# Patient Record
Sex: MALE | Race: BLACK OR AFRICAN AMERICAN | NOT HISPANIC OR LATINO | Employment: STUDENT | URBAN - METROPOLITAN AREA
[De-identification: names, ages, dates, MRNs, and addresses within clinical notes are randomized per-mention and may not be internally consistent; named-entity substitution may affect disease eponyms.]

---

## 2018-05-22 ENCOUNTER — OFFICE VISIT (OUTPATIENT)
Dept: PEDIATRICS | Facility: CLINIC | Age: 23
End: 2018-05-22
Payer: COMMERCIAL

## 2018-05-22 VITALS
DIASTOLIC BLOOD PRESSURE: 60 MMHG | HEART RATE: 64 BPM | HEIGHT: 74 IN | SYSTOLIC BLOOD PRESSURE: 132 MMHG | WEIGHT: 215.63 LBS | BODY MASS INDEX: 27.67 KG/M2

## 2018-05-22 DIAGNOSIS — R42 DIZZINESS: ICD-10-CM

## 2018-05-22 DIAGNOSIS — Z00.00 WELL ADULT EXAM: Primary | ICD-10-CM

## 2018-05-22 PROCEDURE — 99395 PREV VISIT EST AGE 18-39: CPT | Mod: S$GLB,,, | Performed by: PEDIATRICS

## 2018-05-22 NOTE — PROGRESS NOTES
Subjective:     William Walter is a 22 y.o. male here with patient. Patient brought in for Well Child (duran and  Aptela      brought in by mom jus )       History was provided by the patient.    William Walter is a 22 y.o. male who is here for this well-child visit.    Current Issues:  Current concerns include none.  Currently menstruating? not applicable  Sexually active? No   Does patient snore? no     Review of Nutrition:  Current diet: family meals  Balanced diet? yes    Social Screening:   Parental relations: good  Sibling relations: brothers: 1  Discipline concerns? no  Concerns regarding behavior with peers? no  School performance: doing well; no concerns  Secondhand smoke exposure? no    Screening Questions:  Risk factors for anemia: no  Risk factors for vision problems: no  Risk factors for hearing problems: no  Risk factors for tuberculosis: no  Risk factors for dyslipidemia: no  Risk factors for sexually-transmitted infections: no  Risk factors for alcohol/drug use:  no    Review of Systems   Constitutional: Negative.    HENT: Negative.    Eyes: Negative.    Respiratory: Negative.    Cardiovascular: Negative.    Gastrointestinal: Negative.    Genitourinary: Negative.    Musculoskeletal: Negative.    Neurological: Negative.    Psychiatric/Behavioral: Negative.          Objective:     Physical Exam   Constitutional: He is oriented to person, place, and time. He appears well-developed and well-nourished. No distress.   HENT:   Head: Normocephalic.   Right Ear: Hearing, tympanic membrane and external ear normal.   Left Ear: Hearing, tympanic membrane and external ear normal.   Mouth/Throat: Mucous membranes are normal.   Eyes: Conjunctivae are normal. Pupils are equal, round, and reactive to light.   Neck: Normal range of motion. Neck supple.   Cardiovascular: Normal rate, regular rhythm and normal heart sounds.    No murmur heard.  Pulmonary/Chest: Effort normal and breath sounds normal. No  respiratory distress.   Abdominal: Soft. Bowel sounds are normal.   Genitourinary:   Genitourinary Comments: Normal Gu for a pubertal male   Musculoskeletal: He exhibits no edema.   Neurological: He is alert and oriented to person, place, and time.   Skin: Skin is warm and dry. No rash noted.       Assessment:      Well adolescent.      Plan:      1. Anticipatory guidance discussed.  Gave handout on well-child issues at this age.    2.  Weight management:  The patient was counseled regarding physical activity  3. Immunizations today: per orders.    ADDITIONAL NOTE:  This is a patient well known to my practice who  has a past medical history of ADHD (attention deficit hyperactivity disorder).. The patient is here for well check presents with dizzy feeling with exercise. It takes a while to recover.     PE:  Per previous physical and additionally  Gen:NAD calm  CV:RRR and no murmur, 2+ pulses  GI: soft abdomen with normal BS, NT/ND  Neuro: good tone and brisk reflexes      Well adult exam    Dizziness  -     CBC auto differential; Future  -     Comprehensive metabolic panel; Future  -     Hemoglobin A1c; Future  -     TSH; Future  -     T4, FREE; Future

## 2018-05-22 NOTE — PATIENT INSTRUCTIONS
Prevention Guidelines, Men Ages 18 to 39  Screening tests and vaccines are an important part of managing your health. Health counseling is essential, too. Below are guidelines for these, for men ages 18 to 39. Talk with your healthcare provider to make sure youre up-to-date on what you need.  Screening Who needs it How often   Alcohol misuse All men in this age group At routine exams   Blood pressure All men in this age group Every 2 years if your blood pressure is less than 120/80 mm Hg; yearly if your systolic blood pressure is 120 to 139 mm Hg, or your diastolic blood pressure reading is 80 to 89 mm Hg   Depression All men in this age group At routine exams   Diabetes mellitus, type 2 Adults who have no symptoms but are overweight or obese and have 1 or more other risk factors for diabetes At least every 3 years (yearly if blood sugar has already started to rise)   Hepatitis C If at increased risk At routine exams   High cholesterol or triglycerides All men ages 35 and older, and younger men at high risk for coronary artery disease At least every 5 years   HIV All men At routine exams   Obesity All men in this age group At routine exams   Syphilis Men at increased risk for infection - talk with your healthcare provider At routine exams   Tuberculosis Men at increased risk for infection - talk with your healthcare provider Check with your healthcare provider   Vision All men in this age group Every 5 to 10 years if no risk factors for eye disease   Vaccines Who needs it How often   Chickenpox (varicella) All men in this age group who have no record of this infection or vaccine 2 doses; the second dose should be given at least 4 weeks after the first dose   Hepatitis A Men at increased risk for infection - talk with your healthcare provider 2 doses given at least 6 months apart   Hepatitis B Men at increased risk for infection - talk with your healthcare provider 3 doses over 6 months; second dose should be  given 1 month after the first dose; the third dose should be given at least 2 months after the second dose and at least 4 months after the first dose   Haemophilus influenzae Type B (HIB) Men at increased risk for infection - talk with your healthcare provider 1 to 3 doses   Human papillomavirus (HPV) All men in this age group up to age 26 3 doses; the second dose should be given 1 to 2 months after the first dose and the third dose given 6 months after the first dose   Influenza (flu) All men in this age group Once a year   Measles, mumps, rubella (MMR) All men in this age group who have no record of these infections or vaccines 1 or 2 doses through age 55   Meningococcal Men at increased risk for infection - talk with your healthcare provider 1 or more doses   Pneumococca (PCV13) and Pneumococcal (PPSV23) Men at increased risk for infection - talk with your healthcare provider PCV13: 1 dose ages 19 to 65 (protects against 13 types of pneumococcal bacteria)  PPSV23: 1 to 2 doses through age 64, or 1 dose at 65 or older (protects against 23 types of pneumococcal bacteria)   Tetanus/diphtheria/pertussis (Td/Tdap) booster All men in this age group A one-time Tdap booster after age 18, then Td every10 years   Counseling Who needs it How often   Diet and exercise Overweight or obese people When diagnosed, and then at routine exams   Use of tobacco and the health effects it can cause All men in this age group Every visit   Sexually transmitted infection prevention Men who are sexually active At routine exams   Skin cancer Prevention of skin cancer in fair-skinned adults through age 24 At routine exams   1Those who are 18 years of age, who are not up-to-date on their childhood immunizations, should receive all appropriate catch-up vaccines recommended by the CDC.  Date Last Reviewed: 2/1/2017  © 8076-4126 [employer]. [address] [place] [address], [place] [address]. All rights reserved. This information is not  intended as a substitute for professional medical care. Always follow your healthcare professional's instructions.

## 2018-05-23 ENCOUNTER — TELEPHONE (OUTPATIENT)
Dept: PEDIATRICS | Facility: CLINIC | Age: 23
End: 2018-05-23

## 2018-05-23 NOTE — TELEPHONE ENCOUNTER
----- Message from Ximena Pavon MD sent at 5/23/2018  4:40 PM CDT -----  Nurse to inform William that all labs are wnl and stable

## 2019-10-16 NOTE — PROGRESS NOTES
This note was created by combination of typed  and M-Modal dictation.  Transcription errors may be present.  If there are any questions, please contact me.    Assessment & Plan:   Chronic pain of right knee  -pain and swelling of the knee area and distal quadriceps.  I think this is more of the surrounding soft tissue and not intrinsic of the knee joint.  I suspect this may be patellofemoral syndrome.  Home physical therapy handout.  Check plain film x-ray.  History of injury to the proximal tibia.  I am going to refer him to Orthopedics for evaluation and recommendations.  Defer to them regarding any intervention such as injection.  He works and is in school, physical therapy may be difficult for him to attend.  rx for naproxen. Home PT handout.  -     Cancel: X-ray Knee Ortho Right; Future; Expected date: 10/17/2019  -     Ambulatory referral/consult to Orthopedics; Future; Expected date: 10/17/2019  -     naproxen (NAPROSYN) 500 MG tablet; Take 1 tablet (500 mg total) by mouth 2 (two) times daily.  Dispense: 30 tablet; Refill: 2        Medications Discontinued During This Encounter   Medication Reason    lisdexamfetamine (VYVANSE) 40 MG Cap Therapy completed       meds sent this encounter:  Medications Ordered This Encounter   Medications    naproxen (NAPROSYN) 500 MG tablet     Sig: Take 1 tablet (500 mg total) by mouth 2 (two) times daily.     Dispense:  30 tablet     Refill:  2       Follow Up: No follow-ups on file.    Subjective:     Chief Complaint   Patient presents with    Leg Swelling     Right leg        HPI  William is a 24 y.o. male, last appointment with this clinic was Visit date not found.    NP  Has been followed by pediatrics  ADHD, hx of stimulant therapy, not currently taking.  History of leg fracture in the 8th grade.  08/2010    Comes in today complaining of swelling of the distal quadriceps and the surrounding knee area on the right.  History of fracture of his leg back in 8th  grade and had surgery to repair, with some residual soft tissue swelling ever since.  He notes that ever since then, he has had stiffness in his knee.  He used to be able to do more physical activity but as the years progressed he has gotten more and more discomfort and feeling of instability and has been more restricted.  He is now having discomfort with full flexion, in the knee on the medial and lateral side as well as sensation of swelling to that knee and when he looks at the other knee, notices swelling. No recent injury.  Pain with going up and down stairs.    08/12/2010 x-ray of the knee showing acute Salter 2 type fracture involving the proximal right tibia without significant degree of displacement or angulation.  On lateral, linear lucency suggesting a recent nondisplaced and nonangulated fracture of the proximal fibular shaft    He has to wear knee brace otherwise it is too uncomfortable.    Patient Care Team:  Ximena Pavon MD as PCP - General (Pediatrics)    Patient Active Problem List    Diagnosis Date Noted    Overweight(278.02) 08/27/2013     Aug 13 1. Increase activity  2. Decrease intake  3. rtc 6 months for weight check    Dx updated per 2019 IMO Load      ADHD (attention deficit hyperactivity disorder) 09/05/2012     Med check due 11/23/16 vyvanse 40mg (FROM concerta 27mg/ 36 MG DUE TO JITTERINESS ON 11/15/140         PAST MEDICAL HISTORY:  Past Medical History:   Diagnosis Date    ADHD (attention deficit hyperactivity disorder)        PAST SURGICAL HISTORY:  Past Surgical History:   Procedure Laterality Date    CIRCUMCISION      LEG SURGERY Right 2010    tibia fracture     Family History   Problem Relation Age of Onset    No Known Problems Mother     No Known Problems Father     No Known Problems Brother        SOCIAL HISTORY:  Social History     Socioeconomic History    Marital status: Single     Spouse name: Not on file    Number of children: Not on file    Years of education: Not  "on file    Highest education level: Not on file   Occupational History    Occupation: [employer]   Social Needs    Financial resource strain: Not on file    Food insecurity:     Worry: Not on file     Inability: Not on file    Transportation needs:     Medical: Not on file     Non-medical: Not on file   Tobacco Use    Smoking status: Never Smoker    Smokeless tobacco: Never Used   Substance and Sexual Activity    Alcohol use: No    Drug use: No    Sexual activity: Never   Lifestyle    Physical activity:     Days per week: Not on file     Minutes per session: Not on file    Stress: Not on file   Relationships    Social connections:     Talks on phone: Not on file     Gets together: Not on file     Attends Worship service: Not on file     Active member of club or organization: Not on file     Attends meetings of clubs or organizations: Not on file     Relationship status: Not on file   Other Topics Concern    Not on file   Social History Narrative    Not on file       ALLERGIES AND MEDICATIONS: updated and reviewed.  Review of patient's allergies indicates:   Allergen Reactions    Fish containing products      Current Outpatient Medications   Medication Sig Dispense Refill    loratadine (CLARITIN) 10 mg tablet Take 1 tablet (10 mg total) by mouth once daily. 30 tablet 2     No current facility-administered medications for this visit.        Review of Systems   Constitutional: Negative for chills and fever.   Respiratory: Negative for shortness of breath.    Cardiovascular: Negative for chest pain.       Objective:   Physical Exam   Vitals:    10/17/19 1454   BP: 134/68   BP Location: Right arm   Patient Position: Sitting   BP Method: Large (Manual)   Pulse: 63   Temp: 97.8 °F (36.6 °C)   TempSrc: Oral   SpO2: 98%   Weight: 106.3 kg (234 lb 5.6 oz)   Height: 6' 1" (1.854 m)    Body mass index is 30.92 kg/m².  Weight: 106.3 kg (234 lb 5.6 oz)   Height: 6' 1" (185.4 cm)     Physical Exam   Constitutional: He " is oriented to person, place, and time. He appears well-developed and well-nourished. No distress.   Eyes: EOM are normal.   Cardiovascular: Normal rate, regular rhythm and normal heart sounds.   No murmur heard.  Pulmonary/Chest: Effort normal and breath sounds normal.   Musculoskeletal: He exhibits no edema.   The right knee, does feel like there is soft tissue swelling, the knee itself patella does not feel ballotable, full flexion with sensation of tightness, the posterior of the knee is unremarkable.  Varus and valgus stress unremarkable.  Yenifer negative.  Lachman with slightly increased laxity and crepitus of the patella.   Neurological: He is alert and oriented to person, place, and time. Coordination normal.   Skin: Skin is warm and dry.   Psychiatric: He has a normal mood and affect. His behavior is normal. Thought content normal.

## 2019-10-17 ENCOUNTER — HOSPITAL ENCOUNTER (OUTPATIENT)
Dept: RADIOLOGY | Facility: HOSPITAL | Age: 24
Discharge: HOME | End: 2019-10-17
Attending: INTERNAL MEDICINE
Payer: COMMERCIAL

## 2019-10-17 ENCOUNTER — OFFICE VISIT (OUTPATIENT)
Dept: FAMILY MEDICINE | Facility: CLINIC | Age: 24
End: 2019-10-17
Payer: COMMERCIAL

## 2019-10-17 VITALS
OXYGEN SATURATION: 98 % | HEART RATE: 63 BPM | DIASTOLIC BLOOD PRESSURE: 68 MMHG | WEIGHT: 234.38 LBS | BODY MASS INDEX: 31.06 KG/M2 | HEIGHT: 73 IN | SYSTOLIC BLOOD PRESSURE: 134 MMHG | TEMPERATURE: 98 F

## 2019-10-17 DIAGNOSIS — M25.561 CHRONIC PAIN OF RIGHT KNEE: ICD-10-CM

## 2019-10-17 DIAGNOSIS — G89.29 CHRONIC PAIN OF RIGHT KNEE: ICD-10-CM

## 2019-10-17 DIAGNOSIS — M25.561 CHRONIC PAIN OF RIGHT KNEE: Primary | ICD-10-CM

## 2019-10-17 DIAGNOSIS — G89.29 CHRONIC PAIN OF RIGHT KNEE: Primary | ICD-10-CM

## 2019-10-17 PROCEDURE — 99999 PR PBB SHADOW E&M-EST. PATIENT-LVL IV: CPT | Mod: PBBFAC,,, | Performed by: INTERNAL MEDICINE

## 2019-10-17 PROCEDURE — 73562 XR KNEE 3 VIEW RIGHT: ICD-10-PCS | Mod: 26,RT,, | Performed by: RADIOLOGY

## 2019-10-17 PROCEDURE — 3008F PR BODY MASS INDEX (BMI) DOCUMENTED: ICD-10-PCS | Mod: CPTII,S$GLB,, | Performed by: INTERNAL MEDICINE

## 2019-10-17 PROCEDURE — 99203 PR OFFICE/OUTPT VISIT, NEW, LEVL III, 30-44 MIN: ICD-10-PCS | Mod: S$GLB,,, | Performed by: INTERNAL MEDICINE

## 2019-10-17 PROCEDURE — 99999 PR PBB SHADOW E&M-EST. PATIENT-LVL IV: ICD-10-PCS | Mod: PBBFAC,,, | Performed by: INTERNAL MEDICINE

## 2019-10-17 PROCEDURE — 99203 OFFICE O/P NEW LOW 30 MIN: CPT | Mod: S$GLB,,, | Performed by: INTERNAL MEDICINE

## 2019-10-17 PROCEDURE — 73562 X-RAY EXAM OF KNEE 3: CPT | Mod: 26,RT,, | Performed by: RADIOLOGY

## 2019-10-17 PROCEDURE — 3008F BODY MASS INDEX DOCD: CPT | Mod: CPTII,S$GLB,, | Performed by: INTERNAL MEDICINE

## 2019-10-17 PROCEDURE — 73562 X-RAY EXAM OF KNEE 3: CPT | Mod: TC,FY,PO,RT

## 2019-10-17 RX ORDER — NAPROXEN 500 MG/1
500 TABLET ORAL 2 TIMES DAILY
Qty: 30 TABLET | Refills: 2 | Status: SHIPPED | OUTPATIENT
Start: 2019-10-17 | End: 2021-03-29

## 2019-10-20 NOTE — PROGRESS NOTES
No significant radiographic abnormalities.    XR knee results to pt via [facility]. Referred to ortho at [facility]

## 2019-10-24 ENCOUNTER — TELEPHONE (OUTPATIENT)
Dept: FAMILY MEDICINE | Facility: CLINIC | Age: 24
End: 2019-10-24

## 2019-10-24 NOTE — LETTER
October 24, 2019    William Walter  [address] [place] [address]             [facility]  [address] [address]  Phone: [phone]  Fax: [phone] Dear Mr. Walter:    I have been unable to reach you by phone for your appointment to Orthopedic. Please call me at the clinic [phone] to book your appointment.      If you have any questions or concerns, please don't hesitate to call.    Sincerely,        Marley Weaver MA

## 2019-10-25 ENCOUNTER — OFFICE VISIT (OUTPATIENT)
Dept: FAMILY MEDICINE | Facility: CLINIC | Age: 24
End: 2019-10-25
Payer: COMMERCIAL

## 2019-10-25 ENCOUNTER — PATIENT MESSAGE (OUTPATIENT)
Dept: FAMILY MEDICINE | Facility: CLINIC | Age: 24
End: 2019-10-25

## 2019-10-25 VITALS
SYSTOLIC BLOOD PRESSURE: 130 MMHG | HEART RATE: 61 BPM | WEIGHT: 230.38 LBS | DIASTOLIC BLOOD PRESSURE: 70 MMHG | OXYGEN SATURATION: 98 % | HEIGHT: 73 IN | BODY MASS INDEX: 30.53 KG/M2 | TEMPERATURE: 98 F

## 2019-10-25 DIAGNOSIS — M25.561 CHRONIC PAIN OF RIGHT KNEE: ICD-10-CM

## 2019-10-25 DIAGNOSIS — G89.29 CHRONIC PAIN OF RIGHT KNEE: ICD-10-CM

## 2019-10-25 DIAGNOSIS — Z00.00 ANNUAL PHYSICAL EXAM: Primary | ICD-10-CM

## 2019-10-25 PROCEDURE — 99385 PR PREVENTIVE VISIT,NEW,18-39: ICD-10-PCS | Mod: S$GLB,,, | Performed by: FAMILY MEDICINE

## 2019-10-25 PROCEDURE — 99385 PREV VISIT NEW AGE 18-39: CPT | Mod: S$GLB,,, | Performed by: FAMILY MEDICINE

## 2019-10-25 PROCEDURE — 99999 PR PBB SHADOW E&M-EST. PATIENT-LVL III: CPT | Mod: PBBFAC,,, | Performed by: FAMILY MEDICINE

## 2019-10-25 PROCEDURE — 99999 PR PBB SHADOW E&M-EST. PATIENT-LVL III: ICD-10-PCS | Mod: PBBFAC,,, | Performed by: FAMILY MEDICINE

## 2019-10-25 NOTE — PROGRESS NOTES
Routine Office Visit    Patient Name: William Walter    : 1995  MRN: [id number]    Subjective:  William is a 24 y.o. male who presents today for     1. Annual exam / establish care / new to me - he is in school and works at [employer].   2. Bilateral knee pain - left > right. Patient has hx of knee surgery on right knee / tibia. Pain is described as intermittent. Worse with use and associated with knee swelling. Pain is worse when weather changes.  He works at [employer] and states knee pain occurred prior to his work. He was evaluated by Dr. Navarro previously who recommended that patient do home physical therapy and see orthopedics. X-rays reviewed - wnl. Patient states he does exercise regularly. No noted injuries / recent falls / trauma. Patient states he has not received info regarding orthopedic referral.     Review of Systems   Constitutional: Negative for chills and fever.   HENT: Negative for congestion.    Eyes: Negative for blurred vision.   Respiratory: Negative for cough.    Cardiovascular: Negative for chest pain.   Gastrointestinal: Negative for abdominal pain, constipation, diarrhea, heartburn, nausea and vomiting.   Genitourinary: Negative for dysuria.   Musculoskeletal: Positive for joint pain. Negative for myalgias.   Skin: Negative for itching and rash.   Neurological: Negative for dizziness and headaches.   Psychiatric/Behavioral: Negative for depression.       Active Problem List  Patient Active Problem List   Diagnosis    ADHD (attention deficit hyperactivity disorder)    Overweight(278.02)       Past Surgical History  Past Surgical History:   Procedure Laterality Date    CIRCUMCISION      LEG SURGERY Right 2010    tibia fracture       Family History  Family History   Problem Relation Age of Onset    No Known Problems Mother     No Known Problems Father     No Known Problems Brother        Social History  Social History     Socioeconomic History    Marital status: Single     Spouse name: Not on  "file    Number of children: Not on file    Years of education: Not on file    Highest education level: Not on file   Occupational History    Occupation: [employer]    Occupation: student, computer science   Social Needs    Financial resource strain: Not on file    Food insecurity:     Worry: Not on file     Inability: Not on file    Transportation needs:     Medical: Not on file     Non-medical: Not on file   Tobacco Use    Smoking status: Never Smoker    Smokeless tobacco: Never Used   Substance and Sexual Activity    Alcohol use: No    Drug use: No    Sexual activity: Never   Lifestyle    Physical activity:     Days per week: Not on file     Minutes per session: Not on file    Stress: Not at all   Relationships    Social connections:     Talks on phone: Not on file     Gets together: Not on file     Attends Anabaptism service: Not on file     Active member of club or organization: Not on file     Attends meetings of clubs or organizations: Not on file     Relationship status: Not on file   Other Topics Concern    Not on file   Social History Narrative    Not on file       Medications and Allergies  Reviewed and updated.   Current Outpatient Medications   Medication Sig    naproxen (NAPROSYN) 500 MG tablet Take 1 tablet (500 mg total) by mouth 2 (two) times daily.    loratadine (CLARITIN) 10 mg tablet Take 1 tablet (10 mg total) by mouth once daily. (Patient not taking: Reported on 10/25/2019)     No current facility-administered medications for this visit.        Physical Exam  /70 (BP Location: Left arm, Patient Position: Sitting, BP Method: Large (Manual))   Pulse 61   Temp 97.7 °F (36.5 °C) (Oral)   Ht 6' 1" (1.854 m)   Wt 104.5 kg (230 lb 6.1 oz)   SpO2 98%   BMI 30.40 kg/m²   Physical Exam   Constitutional: He is oriented to person, place, and time. He appears well-developed and well-nourished.   HENT:   Head: Normocephalic and atraumatic.   Eyes: Pupils are equal, round, and reactive " to light. Conjunctivae and EOM are normal.   Neck: Normal range of motion. Neck supple. No JVD present. No thyromegaly present.   Cardiovascular: Normal rate, regular rhythm and normal heart sounds.   Pulmonary/Chest: Effort normal and breath sounds normal. He has no wheezes.   Abdominal: Soft. Bowel sounds are normal. He exhibits no distension. There is no tenderness. There is no guarding.   Musculoskeletal: Normal range of motion.        Right knee: He exhibits normal range of motion and normal alignment. No tenderness found.   Lymphadenopathy:     He has no cervical adenopathy.   Neurological: He is alert and oriented to person, place, and time.   Skin: Skin is warm and dry.   Psychiatric: He has a normal mood and affect. His behavior is normal.         Assessment/Plan:  William Walter is a 24 y.o. male who presents today for :    Problem List Items Addressed This Visit     None      Visit Diagnoses     Annual physical exam    -  Primary    Relevant Orders    Lipid panel    Comprehensive metabolic panel    TSH  Health Maintenance       Date Due Completion Date    TETANUS VACCINE 08/11/2018 8/11/2008    Influenza Vaccine (1) 09/01/2019 9/24/2011        I addressed all major concerns as it related to health maintenance.  All were ordered and scheduled based on the patients wishes.  Any additional health maintenance will be readdressed at the next physical if declined or deferred by the patient.      Chronic pain of right knee        Relevant Orders    Ambulatory referral/consult to Orthopedics            Follow up in about 1 year (around 10/25/2020) for chronic conditions follow-up.

## 2019-11-05 ENCOUNTER — TELEPHONE (OUTPATIENT)
Dept: SPORTS MEDICINE | Facility: CLINIC | Age: 24
End: 2019-11-05

## 2020-07-02 ENCOUNTER — OFFICE VISIT (OUTPATIENT)
Dept: URGENT CARE | Facility: CLINIC | Age: 25
End: 2020-07-02
Payer: COMMERCIAL

## 2020-07-02 VITALS
SYSTOLIC BLOOD PRESSURE: 123 MMHG | WEIGHT: 230 LBS | BODY MASS INDEX: 30.34 KG/M2 | TEMPERATURE: 98 F | OXYGEN SATURATION: 96 % | HEART RATE: 61 BPM | DIASTOLIC BLOOD PRESSURE: 77 MMHG
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
## 2020-07-02 DIAGNOSIS — S60.221A CONTUSION OF RIGHT HAND, INITIAL ENCOUNTER: Primary | ICD-10-CM

## 2020-07-02 PROCEDURE — 99214 PR OFFICE/OUTPT VISIT, EST, LEVL IV, 30-39 MIN: ICD-10-PCS | Mod: S$GLB,,, | Performed by: PHYSICIAN ASSISTANT

## 2020-07-02 PROCEDURE — 99214 OFFICE O/P EST MOD 30 MIN: CPT | Mod: S$GLB,,, | Performed by: PHYSICIAN ASSISTANT

## 2020-07-02 RX ORDER — IBUPROFEN 600 MG/1
600 TABLET ORAL EVERY 6 HOURS PRN
Qty: 15 TABLET | Refills: 0 | Status: SHIPPED | OUTPATIENT
Start: 2020-07-02 | End: 2020-07-07

## 2020-07-03 NOTE — PATIENT INSTRUCTIONS
General Discharge Instructions     Rest, ice, repeat. (DO NOT APPLY ICE DIRECTLY TO THE SKIN.  DO NOT LEAVE ON AFFECTED BODY PART FOR MORE THAN 15 MINUTES AT AT TIME TO AVOID INJURY TO SOFT TISSUE).    If you were prescribed a narcotic or controlled medication, do not drive or operate heavy equipment or machinery while taking these medications.  If you were prescribed antibiotics, please take them to completion.  You must understand that you've received an Urgent Care treatment only and that you may be released before all your medical problems are known or treated. You, the patient, will arrange for follow up care as instructed.  Follow up with your PCP or specialty clinic as directed in the next 1-2 weeks if not improved or as needed.  You can call [phone] to schedule an appointment with the appropriate provider.  If your condition worsens we recommend that you receive another evaluation at the emergency room immediately or contact your primary medical clinics after hours call service to discuss your concerns.  Please return here or go to the Emergency Department for any concerns or worsening of condition.      Hand Contusion  You have a contusion. This is also called a bruise. There is swelling and some bleeding under the skin, but no broken bones. This injury generally takes a few days to a few weeks to heal.  During that time, the bruise will typically change in color from reddish, to purple-blue, to greenish-yellow, then to yellow-brown.  Home care  · Elevate the hand to reduce pain and swelling. As much as possible, sit or lie down with the hand raised about the level of your heart. This is especially important during the first 48 hours.  · Ice the hand to help reduce pain and swelling. Wrap a cold source (ice pack or ice cubes in a plastic bag) in a thin towel. Apply to the bruised area for 20 minutes every 1 to 2 hours the first day. Continue this 3 to 4 times a day until the pain and swelling goes  away.  · Unless another medicine was prescribed, you can take acetaminophen, ibuprofen, or naproxen to control pain. (If you have chronic liver or kidney disease or ever had a stomach ulcer or gastrointestinal bleeding, talk with your doctor before using these medicines.)  Follow up  Follow up with your healthcare provider or our staff as advised. Call if you are not improving within 1 to 2 weeks.  When to seek medical advice   Call your healthcare provider right away if you have any of the following:  · Increased pain or swelling  · Arm becomes cold, blue, numb or tingly  · Signs of infection: Warmth, drainage, or increased redness or pain around the bruise  · Inability to move the injured hand   · Frequent bruising for unknown reasons  Date Last Reviewed: 2/1/2017 © 2000-2017 [employer] [address] [place] [address], [place] [address]. All rights reserved. This information is not intended as a substitute for professional medical care. Always follow your healthcare professional's instructions.        Animal Bite (General)  An animal bite can cause a wound deep enough to break the skin. In such cases, the wound is cleaned and then sometimes closed. If the wound is closed, it may not be closed completely. This is so that fluid can drain if the wound becomes infected. In addition to wound care, a tetanus shot may be given, if needed.    Home care  · Care for the wound as directed. If a dressing was applied to the wound, be sure to change it as directed.  · Wash your hands well with soap and warm water before and after caring for the wound. This helps lower the risk of infection.  · If the wound bleeds, place a clean, soft cloth on the wound. Then firmly apply pressure until the bleeding stops. This may take up to 5 minutes. Do not release the pressure and look at the wound during this time.  · Most skin wounds heal within 10 days. But an infection can occur even with proper treatment. So be sure to watch the  wound for signs of infection (see below). Check the wound as often as directed by your healthcare provider.  · Antibiotics may be prescribed. These help prevent or treat infection. If youre given antibiotics, take them as directed. Also be sure to complete the medicines.  Rabies prevention  Rabies is a virus that can be carried in certain animals. These can include domestic animals such as dogs and cats. Wild animals such as skunks, raccoons, foxes, and bats can also carry rabies. Pets fully vaccinated against rabies (2 shots) are at very low risk of infection. But because human rabies is almost always fatal, any biting pet should be confined for 10 days as an extra precaution. In general, if there is a risk for rabies, the following steps may need to be taken:  · If someones pet dog or cat has bitten you, it should be kept in a secure area for the next 10 days to watch for signs of illness. (If the pet owner wont allow this, contact your local animal control center.) If the dog or cat becomes ill or dies during that time, contact your local animal control center at once so the animal may be tested for rabies. If the pet stays healthy for the next 10 days, there is no danger of rabies in the animal or you.  · If a stray pet bit you, contact your local animal control center. They can give information on capture, quarantine, and animal rabies testing.  · If you cant find the animal that bit you in the next 2 days, and if rabies exists in your region, you may need to receive the rabies vaccine series. Call your healthcare provider right away. Or return to the emergency department promptly.  · All animal bites should be reported to the local animal control center. If you were not given a form to fill out, you can report this yourself.  Follow-up care  Follow up with your healthcare provider, or as directed.  When to seek medical advice  Call your healthcare provider right away if any of these occur:  · Signs of  infection:  ¨ Spreading redness or warmth from the wound  ¨ Increased pain or swelling  ¨ Fever of 100.4ºF (38ºC) or higher, or as directed by your healthcare provider  ¨ Colored fluid or pus draining from the wound  · Signs of rabies infection:  ¨ Headache  ¨ Confusion  ¨ Strange behavior  ¨ Increased salivating and drooling  ¨ Seizure  · Decreased ability to move any body part near the bite area  · Bleeding that can't be stopped after 5 minutes of firm pressure  Date Last Reviewed: 3/1/2017  © 6224-9719 [employer]. [address] [place] [address]. All rights reserved. This information is not intended as a substitute for professional medical care. Always follow your healthcare professional's instructions.

## 2020-07-03 NOTE — PROGRESS NOTES
"Subjective:       Patient ID: William Walter is a 25 y.o. male.    Vitals:  weight is 104.3 kg (230 lb). His temperature is 98.1 °F (36.7 °C). His blood pressure is 123/77 and his pulse is 61. His oxygen saturation is 96%.     Chief Complaint: Animal Bite    Patient presenting with right hand pain and swelling 2/2 dog bite today. Reports that he was walking home when his neighbor's dog jumped up and bit him on the hand. States that it did not break skin but now it "feels weird."    Animal Bite   The incident occurred today. There is an injury to the right hand. The patient is experiencing no pain. Associated symptoms include tingling. Pertinent negatives include no abdominal pain, no light-headedness and no loss of consciousness. There have been no prior injuries to these areas.       Constitution: Negative for fatigue.   HENT: Negative for facial swelling and facial trauma.    Neck: Negative for neck stiffness.   Cardiovascular: Negative for chest trauma.   Eyes: Negative for eye trauma, double vision and blurred vision.   Gastrointestinal: Negative for abdominal trauma, abdominal pain and rectal bleeding.   Genitourinary: Negative for hematuria, genital trauma and pelvic pain.   Musculoskeletal: Positive for trauma and muscle ache. Negative for pain, joint swelling, abnormal ROM of joint and pain with walking.   Skin: Negative for color change, wound, abrasion, laceration, puncture wound and erythema.   Neurological: Negative for dizziness, history of vertigo, light-headedness, coordination disturbances, altered mental status and loss of consciousness.   Hematologic/Lymphatic: Negative for history of bleeding disorder.   Psychiatric/Behavioral: Negative for altered mental status.       Objective:      Physical Exam   Constitutional: He is oriented to person, place, and time. He appears well-developed. He is cooperative.  Non-toxic appearance. He does not appear ill. No distress.   HENT:   Head: Normocephalic and " atraumatic.   Right Ear: Hearing, tympanic membrane, external ear and ear canal normal.   Left Ear: Hearing, tympanic membrane, external ear and ear canal normal.   Nose: Nose normal. No mucosal edema, rhinorrhea or nasal deformity. No epistaxis. Right sinus exhibits no maxillary sinus tenderness and no frontal sinus tenderness. Left sinus exhibits no maxillary sinus tenderness and no frontal sinus tenderness.   Mouth/Throat: Uvula is midline, oropharynx is clear and moist and mucous membranes are normal. No trismus in the jaw. Normal dentition. No uvula swelling. No posterior oropharyngeal erythema.   Eyes: Conjunctivae and lids are normal. Right eye exhibits no discharge. Left eye exhibits no discharge. No scleral icterus.   Neck: Trachea normal, normal range of motion, full passive range of motion without pain and phonation normal. Neck supple.   Cardiovascular: Normal rate, regular rhythm, normal heart sounds and normal pulses.   Pulmonary/Chest: Effort normal and breath sounds normal. No respiratory distress.   Abdominal: Soft. Normal appearance and bowel sounds are normal. He exhibits no distension, no pulsatile midline mass and no mass. There is no abdominal tenderness.   Musculoskeletal: Normal range of motion.         General: No deformity.      Right hand: He exhibits tenderness. He exhibits normal range of motion, no laceration and no swelling. Normal sensation noted. Normal strength noted.        Hands:    Neurological: He is alert and oriented to person, place, and time. He exhibits normal muscle tone. Coordination normal.   Skin: Skin is warm, dry, intact, not diaphoretic and not pale. erythema  Psychiatric: His speech is normal and behavior is normal. Judgment and thought content normal.   Nursing note and vitals reviewed.        Assessment:       1. Contusion of right hand, initial encounter    2. Dog bite, initial encounter        Plan:         Contusion of right hand, initial encounter  -      ibuprofen (ADVIL,MOTRIN) 600 MG tablet; Take 1 tablet (600 mg total) by mouth every 6 (six) hours as needed for Pain. Take Tylenol 650mg with this medication for added pain control  Dispense: 15 tablet; Refill: 0    Dog bite, initial encounter  -     ibuprofen (ADVIL,MOTRIN) 600 MG tablet; Take 1 tablet (600 mg total) by mouth every 6 (six) hours as needed for Pain. Take Tylenol 650mg with this medication for added pain control  Dispense: 15 tablet; Refill: 0      Patient Instructions   General Discharge Instructions     Rest, ice, repeat. (DO NOT APPLY ICE DIRECTLY TO THE SKIN.  DO NOT LEAVE ON AFFECTED BODY PART FOR MORE THAN 15 MINUTES AT AT TIME TO AVOID INJURY TO SOFT TISSUE).    If you were prescribed a narcotic or controlled medication, do not drive or operate heavy equipment or machinery while taking these medications.  If you were prescribed antibiotics, please take them to completion.  You must understand that you've received an Urgent Care treatment only and that you may be released before all your medical problems are known or treated. You, the patient, will arrange for follow up care as instructed.  Follow up with your PCP or specialty clinic as directed in the next 1-2 weeks if not improved or as needed.  You can call [phone] to schedule an appointment with the appropriate provider.  If your condition worsens we recommend that you receive another evaluation at the emergency room immediately or contact your primary medical clinics after hours call service to discuss your concerns.  Please return here or go to the Emergency Department for any concerns or worsening of condition.      Hand Contusion  You have a contusion. This is also called a bruise. There is swelling and some bleeding under the skin, but no broken bones. This injury generally takes a few days to a few weeks to heal.  During that time, the bruise will typically change in color from reddish, to purple-blue, to greenish-yellow, then  to yellow-brown.  Home care  · Elevate the hand to reduce pain and swelling. As much as possible, sit or lie down with the hand raised about the level of your heart. This is especially important during the first 48 hours.  · Ice the hand to help reduce pain and swelling. Wrap a cold source (ice pack or ice cubes in a plastic bag) in a thin towel. Apply to the bruised area for 20 minutes every 1 to 2 hours the first day. Continue this 3 to 4 times a day until the pain and swelling goes away.  · Unless another medicine was prescribed, you can take acetaminophen, ibuprofen, or naproxen to control pain. (If you have chronic liver or kidney disease or ever had a stomach ulcer or gastrointestinal bleeding, talk with your doctor before using these medicines.)  Follow up  Follow up with your healthcare provider or our staff as advised. Call if you are not improving within 1 to 2 weeks.  When to seek medical advice   Call your healthcare provider right away if you have any of the following:  · Increased pain or swelling  · Arm becomes cold, blue, numb or tingly  · Signs of infection: Warmth, drainage, or increased redness or pain around the bruise  · Inability to move the injured hand   · Frequent bruising for unknown reasons  Date Last Reviewed: 2/1/2017  © 1638-2416 [employer]. [address] [place] [address], [place] [address]. All rights reserved. This information is not intended as a substitute for professional medical care. Always follow your healthcare professional's instructions.        Animal Bite (General)  An animal bite can cause a wound deep enough to break the skin. In such cases, the wound is cleaned and then sometimes closed. If the wound is closed, it may not be closed completely. This is so that fluid can drain if the wound becomes infected. In addition to wound care, a tetanus shot may be given, if needed.    Home care  · Care for the wound as directed. If a dressing was applied to the wound, be sure  to change it as directed.  · Wash your hands well with soap and warm water before and after caring for the wound. This helps lower the risk of infection.  · If the wound bleeds, place a clean, soft cloth on the wound. Then firmly apply pressure until the bleeding stops. This may take up to 5 minutes. Do not release the pressure and look at the wound during this time.  · Most skin wounds heal within 10 days. But an infection can occur even with proper treatment. So be sure to watch the wound for signs of infection (see below). Check the wound as often as directed by your healthcare provider.  · Antibiotics may be prescribed. These help prevent or treat infection. If youre given antibiotics, take them as directed. Also be sure to complete the medicines.  Rabies prevention  Rabies is a virus that can be carried in certain animals. These can include domestic animals such as dogs and cats. Wild animals such as skunks, raccoons, foxes, and bats can also carry rabies. Pets fully vaccinated against rabies (2 shots) are at very low risk of infection. But because human rabies is almost always fatal, any biting pet should be confined for 10 days as an extra precaution. In general, if there is a risk for rabies, the following steps may need to be taken:  · If someones pet dog or cat has bitten you, it should be kept in a secure area for the next 10 days to watch for signs of illness. (If the pet owner wont allow this, contact your local animal control center.) If the dog or cat becomes ill or dies during that time, contact your local animal control center at once so the animal may be tested for rabies. If the pet stays healthy for the next 10 days, there is no danger of rabies in the animal or you.  · If a stray pet bit you, contact your local animal control center. They can give information on capture, quarantine, and animal rabies testing.  · If you cant find the animal that bit you in the next 2 days, and if rabies  exists in your region, you may need to receive the rabies vaccine series. Call your healthcare provider right away. Or return to the emergency department promptly.  · All animal bites should be reported to the local animal control center. If you were not given a form to fill out, you can report this yourself.  Follow-up care  Follow up with your healthcare provider, or as directed.  When to seek medical advice  Call your healthcare provider right away if any of these occur:  · Signs of infection:  ¨ Spreading redness or warmth from the wound  ¨ Increased pain or swelling  ¨ Fever of 100.4ºF (38ºC) or higher, or as directed by your healthcare provider  ¨ Colored fluid or pus draining from the wound  · Signs of rabies infection:  ¨ Headache  ¨ Confusion  ¨ Strange behavior  ¨ Increased salivating and drooling  ¨ Seizure  · Decreased ability to move any body part near the bite area  · Bleeding that can't be stopped after 5 minutes of firm pressure  Date Last Reviewed: 3/1/2017  © 3156-0322 [employer]. [address] [place] [address], [place] [address]. All rights reserved. This information is not intended as a substitute for professional medical care. Always follow your healthcare professional's instructions.

## 2020-08-14 DIAGNOSIS — Z11.59 NEED FOR HEPATITIS C SCREENING TEST: ICD-10-CM

## 2021-03-29 ENCOUNTER — OFFICE VISIT (OUTPATIENT)
Dept: FAMILY MEDICINE | Facility: CLINIC | Age: 26
End: 2021-03-29
Payer: COMMERCIAL

## 2021-03-29 VITALS
DIASTOLIC BLOOD PRESSURE: 70 MMHG | OXYGEN SATURATION: 96 % | HEART RATE: 70 BPM | BODY MASS INDEX: 33.84 KG/M2 | HEIGHT: 73 IN | SYSTOLIC BLOOD PRESSURE: 122 MMHG | TEMPERATURE: 98 F | WEIGHT: 255.31 LBS

## 2021-03-29 DIAGNOSIS — M25.561 CHRONIC PAIN OF RIGHT KNEE: ICD-10-CM

## 2021-03-29 DIAGNOSIS — G89.29 CHRONIC PAIN OF RIGHT KNEE: ICD-10-CM

## 2021-03-29 DIAGNOSIS — Z00.00 ANNUAL PHYSICAL EXAM: Primary | ICD-10-CM

## 2021-03-29 DIAGNOSIS — Z91.013 FISH ALLERGY: ICD-10-CM

## 2021-03-29 PROCEDURE — 99395 PREV VISIT EST AGE 18-39: CPT | Mod: S$GLB,,, | Performed by: FAMILY MEDICINE

## 2021-03-29 PROCEDURE — 3008F PR BODY MASS INDEX (BMI) DOCUMENTED: ICD-10-PCS | Mod: CPTII,S$GLB,, | Performed by: FAMILY MEDICINE

## 2021-03-29 PROCEDURE — 99395 PR PREVENTIVE VISIT,EST,18-39: ICD-10-PCS | Mod: S$GLB,,, | Performed by: FAMILY MEDICINE

## 2021-03-29 PROCEDURE — 1126F AMNT PAIN NOTED NONE PRSNT: CPT | Mod: S$GLB,,, | Performed by: FAMILY MEDICINE

## 2021-03-29 PROCEDURE — 99999 PR PBB SHADOW E&M-EST. PATIENT-LVL IV: CPT | Mod: PBBFAC,,, | Performed by: FAMILY MEDICINE

## 2021-03-29 PROCEDURE — 3008F BODY MASS INDEX DOCD: CPT | Mod: CPTII,S$GLB,, | Performed by: FAMILY MEDICINE

## 2021-03-29 PROCEDURE — 1126F PR PAIN SEVERITY QUANTIFIED, NO PAIN PRESENT: ICD-10-PCS | Mod: S$GLB,,, | Performed by: FAMILY MEDICINE

## 2021-03-29 PROCEDURE — 99999 PR PBB SHADOW E&M-EST. PATIENT-LVL IV: ICD-10-PCS | Mod: PBBFAC,,, | Performed by: FAMILY MEDICINE

## 2021-03-29 RX ORDER — EPINEPHRINE 0.3 MG/.3ML
1 INJECTION SUBCUTANEOUS ONCE
Qty: 1 EACH | Refills: 0 | Status: SHIPPED | OUTPATIENT
Start: 2021-03-29 | End: 2022-05-27

## 2021-03-30 ENCOUNTER — TELEPHONE (OUTPATIENT)
Dept: FAMILY MEDICINE | Facility: CLINIC | Age: 26
End: 2021-03-30
Payer: COMMERCIAL

## 2021-03-30 NOTE — TELEPHONE ENCOUNTER
Spoke to patient regarding a referral to orthopedics ,patient states he can only do week ends and that he will call back when ready to schedule

## 2021-04-05 ENCOUNTER — PATIENT MESSAGE (OUTPATIENT)
Dept: FAMILY MEDICINE | Facility: CLINIC | Age: 26
End: 2021-04-05

## 2021-04-16 ENCOUNTER — PATIENT MESSAGE (OUTPATIENT)
Dept: RESEARCH | Facility: HOSPITAL | Age: 26
End: 2021-04-16

## 2021-04-17 ENCOUNTER — LAB VISIT (OUTPATIENT)
Dept: LAB | Facility: HOSPITAL | Age: 26
End: 2021-04-17
Attending: FAMILY MEDICINE
Payer: COMMERCIAL

## 2021-04-17 DIAGNOSIS — Z00.00 ANNUAL PHYSICAL EXAM: ICD-10-CM

## 2021-04-17 LAB
ALBUMIN SERPL BCP-MCNC: 4.2 G/DL (ref 3.5–5.2)
ALP SERPL-CCNC: 48 U/L (ref 55–135)
ALT SERPL W/O P-5'-P-CCNC: 26 U/L (ref 10–44)
ANION GAP SERPL CALC-SCNC: 8 MMOL/L (ref 8–16)
AST SERPL-CCNC: 31 U/L (ref 10–40)
BASOPHILS # BLD AUTO: 0.04 K/UL (ref 0–0.2)
BASOPHILS NFR BLD: 0.7 % (ref 0–1.9)
BILIRUB SERPL-MCNC: 1.4 MG/DL (ref 0.1–1)
BUN SERPL-MCNC: 16 MG/DL (ref 6–20)
CALCIUM SERPL-MCNC: 9.4 MG/DL (ref 8.7–10.5)
CHLORIDE SERPL-SCNC: 105 MMOL/L (ref 95–110)
CHOLEST SERPL-MCNC: 208 MG/DL (ref 120–199)
CHOLEST/HDLC SERPL: 2.9 {RATIO} (ref 2–5)
CO2 SERPL-SCNC: 26 MMOL/L (ref 23–29)
CREAT SERPL-MCNC: 0.9 MG/DL (ref 0.5–1.4)
DIFFERENTIAL METHOD: ABNORMAL
EOSINOPHIL # BLD AUTO: 0 K/UL (ref 0–0.5)
EOSINOPHIL NFR BLD: 0.7 % (ref 0–8)
ERYTHROCYTE [DISTWIDTH] IN BLOOD BY AUTOMATED COUNT: 12.1 % (ref 11.5–14.5)
EST. GFR  (AFRICAN AMERICAN): >60 ML/MIN/1.73 M^2
EST. GFR  (NON AFRICAN AMERICAN): >60 ML/MIN/1.73 M^2
ESTIMATED AVG GLUCOSE: 105 MG/DL (ref 68–131)
GLUCOSE SERPL-MCNC: 93 MG/DL (ref 70–110)
HBA1C MFR BLD: 5.3 % (ref 4–5.6)
HCT VFR BLD AUTO: 45.2 % (ref 40–54)
HDLC SERPL-MCNC: 72 MG/DL (ref 40–75)
HDLC SERPL: 34.6 % (ref 20–50)
HGB BLD-MCNC: 14.7 G/DL (ref 14–18)
IMM GRANULOCYTES # BLD AUTO: 0.01 K/UL (ref 0–0.04)
IMM GRANULOCYTES NFR BLD AUTO: 0.2 % (ref 0–0.5)
LDLC SERPL CALC-MCNC: 127.2 MG/DL (ref 63–159)
LYMPHOCYTES # BLD AUTO: 0.9 K/UL (ref 1–4.8)
LYMPHOCYTES NFR BLD: 14.4 % (ref 18–48)
MCH RBC QN AUTO: 31 PG (ref 27–31)
MCHC RBC AUTO-ENTMCNC: 32.5 G/DL (ref 32–36)
MCV RBC AUTO: 95 FL (ref 82–98)
MONOCYTES # BLD AUTO: 0.5 K/UL (ref 0.3–1)
MONOCYTES NFR BLD: 8.7 % (ref 4–15)
NEUTROPHILS # BLD AUTO: 4.5 K/UL (ref 1.8–7.7)
NEUTROPHILS NFR BLD: 75.3 % (ref 38–73)
NONHDLC SERPL-MCNC: 136 MG/DL
NRBC BLD-RTO: 0 /100 WBC
PLATELET # BLD AUTO: 176 K/UL (ref 150–450)
PMV BLD AUTO: 11.8 FL (ref 9.2–12.9)
POTASSIUM SERPL-SCNC: 3.8 MMOL/L (ref 3.5–5.1)
PROT SERPL-MCNC: 7.8 G/DL (ref 6–8.4)
RBC # BLD AUTO: 4.74 M/UL (ref 4.6–6.2)
SODIUM SERPL-SCNC: 139 MMOL/L (ref 136–145)
TRIGL SERPL-MCNC: 44 MG/DL (ref 30–150)
TSH SERPL DL<=0.005 MIU/L-ACNC: 0.92 UIU/ML (ref 0.4–4)
WBC # BLD AUTO: 5.99 K/UL (ref 3.9–12.7)

## 2021-04-17 PROCEDURE — 86703 HIV-1/HIV-2 1 RESULT ANTBDY: CPT | Performed by: FAMILY MEDICINE

## 2021-04-17 PROCEDURE — 83036 HEMOGLOBIN GLYCOSYLATED A1C: CPT | Performed by: FAMILY MEDICINE

## 2021-04-17 PROCEDURE — 80061 LIPID PANEL: CPT | Performed by: FAMILY MEDICINE

## 2021-04-17 PROCEDURE — 85025 COMPLETE CBC W/AUTO DIFF WBC: CPT | Performed by: FAMILY MEDICINE

## 2021-04-17 PROCEDURE — 84443 ASSAY THYROID STIM HORMONE: CPT | Performed by: FAMILY MEDICINE

## 2021-04-17 PROCEDURE — 80053 COMPREHEN METABOLIC PANEL: CPT | Performed by: FAMILY MEDICINE

## 2021-04-17 PROCEDURE — 36415 COLL VENOUS BLD VENIPUNCTURE: CPT | Mod: PO | Performed by: FAMILY MEDICINE

## 2021-04-19 LAB — HIV 1+2 AB+HIV1 P24 AG SERPL QL IA: NEGATIVE

## 2021-06-28 ENCOUNTER — PATIENT MESSAGE (OUTPATIENT)
Dept: FAMILY MEDICINE | Facility: CLINIC | Age: 26
End: 2021-06-28

## 2021-06-29 ENCOUNTER — PATIENT MESSAGE (OUTPATIENT)
Dept: FAMILY MEDICINE | Facility: CLINIC | Age: 26
End: 2021-06-29

## 2021-06-29 RX ORDER — HYDROCORTISONE 25 MG/G
CREAM TOPICAL 2 TIMES DAILY
Qty: 28 G | Refills: 1 | Status: SHIPPED | OUTPATIENT
Start: 2021-06-29

## 2021-12-04 ENCOUNTER — IMMUNIZATION (OUTPATIENT)
Dept: PRIMARY CARE CLINIC | Facility: CLINIC | Age: 26
End: 2021-12-04
Payer: COMMERCIAL

## 2021-12-04 DIAGNOSIS — Z23 NEED FOR VACCINATION: Primary | ICD-10-CM

## 2021-12-04 PROCEDURE — 0004A COVID-19, MRNA, LNP-S, PF, 30 MCG/0.3 ML DOSE VACCINE: CPT | Mod: CV19,PBBFAC | Performed by: INTERNAL MEDICINE

## 2021-12-08 DIAGNOSIS — Z11.59 NEED FOR HEPATITIS C SCREENING TEST: ICD-10-CM

## 2022-04-11 ENCOUNTER — PATIENT MESSAGE (OUTPATIENT)
Dept: FAMILY MEDICINE | Facility: CLINIC | Age: 27
End: 2022-04-11
Payer: COMMERCIAL

## 2022-05-27 ENCOUNTER — OFFICE VISIT (OUTPATIENT)
Dept: FAMILY MEDICINE | Facility: CLINIC | Age: 27
End: 2022-05-27
Payer: COMMERCIAL

## 2022-05-27 VITALS
WEIGHT: 255.63 LBS | DIASTOLIC BLOOD PRESSURE: 70 MMHG | HEIGHT: 73 IN | TEMPERATURE: 98 F | OXYGEN SATURATION: 97 % | BODY MASS INDEX: 33.88 KG/M2 | HEART RATE: 64 BPM | SYSTOLIC BLOOD PRESSURE: 110 MMHG

## 2022-05-27 DIAGNOSIS — Z23 NEED FOR DIPHTHERIA-TETANUS-PERTUSSIS (TDAP) VACCINE: ICD-10-CM

## 2022-05-27 DIAGNOSIS — E78.5 HYPERLIPIDEMIA, UNSPECIFIED HYPERLIPIDEMIA TYPE: ICD-10-CM

## 2022-05-27 DIAGNOSIS — S76.212A STRAIN OF LEFT GROIN: ICD-10-CM

## 2022-05-27 DIAGNOSIS — Z00.00 WELLNESS EXAMINATION: Primary | ICD-10-CM

## 2022-05-27 PROCEDURE — 3008F BODY MASS INDEX DOCD: CPT | Mod: CPTII,S$GLB,, | Performed by: FAMILY MEDICINE

## 2022-05-27 PROCEDURE — 1159F MED LIST DOCD IN RCRD: CPT | Mod: CPTII,S$GLB,, | Performed by: FAMILY MEDICINE

## 2022-05-27 PROCEDURE — 3074F SYST BP LT 130 MM HG: CPT | Mod: CPTII,S$GLB,, | Performed by: FAMILY MEDICINE

## 2022-05-27 PROCEDURE — 3078F PR MOST RECENT DIASTOLIC BLOOD PRESSURE < 80 MM HG: ICD-10-PCS | Mod: CPTII,S$GLB,, | Performed by: FAMILY MEDICINE

## 2022-05-27 PROCEDURE — 99999 PR PBB SHADOW E&M-EST. PATIENT-LVL IV: CPT | Mod: PBBFAC,,, | Performed by: FAMILY MEDICINE

## 2022-05-27 PROCEDURE — 3078F DIAST BP <80 MM HG: CPT | Mod: CPTII,S$GLB,, | Performed by: FAMILY MEDICINE

## 2022-05-27 PROCEDURE — 99214 PR OFFICE/OUTPT VISIT, EST, LEVL IV, 30-39 MIN: ICD-10-PCS | Mod: 25,S$GLB,, | Performed by: FAMILY MEDICINE

## 2022-05-27 PROCEDURE — 90471 TDAP VACCINE GREATER THAN OR EQUAL TO 7YO IM: ICD-10-PCS | Mod: S$GLB,,, | Performed by: FAMILY MEDICINE

## 2022-05-27 PROCEDURE — 1160F RVW MEDS BY RX/DR IN RCRD: CPT | Mod: CPTII,S$GLB,, | Performed by: FAMILY MEDICINE

## 2022-05-27 PROCEDURE — 3008F PR BODY MASS INDEX (BMI) DOCUMENTED: ICD-10-PCS | Mod: CPTII,S$GLB,, | Performed by: FAMILY MEDICINE

## 2022-05-27 PROCEDURE — 90715 TDAP VACCINE 7 YRS/> IM: CPT | Mod: S$GLB,,, | Performed by: FAMILY MEDICINE

## 2022-05-27 PROCEDURE — 90471 IMMUNIZATION ADMIN: CPT | Mod: S$GLB,,, | Performed by: FAMILY MEDICINE

## 2022-05-27 PROCEDURE — 3074F PR MOST RECENT SYSTOLIC BLOOD PRESSURE < 130 MM HG: ICD-10-PCS | Mod: CPTII,S$GLB,, | Performed by: FAMILY MEDICINE

## 2022-05-27 PROCEDURE — 1160F PR REVIEW ALL MEDS BY PRESCRIBER/CLIN PHARMACIST DOCUMENTED: ICD-10-PCS | Mod: CPTII,S$GLB,, | Performed by: FAMILY MEDICINE

## 2022-05-27 PROCEDURE — 1159F PR MEDICATION LIST DOCUMENTED IN MEDICAL RECORD: ICD-10-PCS | Mod: CPTII,S$GLB,, | Performed by: FAMILY MEDICINE

## 2022-05-27 PROCEDURE — 90715 TDAP VACCINE GREATER THAN OR EQUAL TO 7YO IM: ICD-10-PCS | Mod: S$GLB,,, | Performed by: FAMILY MEDICINE

## 2022-05-27 PROCEDURE — 99999 PR PBB SHADOW E&M-EST. PATIENT-LVL IV: ICD-10-PCS | Mod: PBBFAC,,, | Performed by: FAMILY MEDICINE

## 2022-05-27 PROCEDURE — 99214 OFFICE O/P EST MOD 30 MIN: CPT | Mod: 25,S$GLB,, | Performed by: FAMILY MEDICINE

## 2022-05-27 NOTE — PROGRESS NOTES
Assessment & Plan  Wellness examination  -     CBC Auto Differential; Future; Expected date: 05/27/2022  -     Comprehensive Metabolic Panel; Future; Expected date: 05/27/2022  -     Hemoglobin A1C; Future; Expected date: 05/27/2022  -     Lipid Panel; Future; Expected date: 05/27/2022  -     Hepatitis C Antibody; Future; Expected date: 05/27/2022    Hyperlipidemia, unspecified hyperlipidemia type  -     Lipid Panel; Future; Expected date: 05/27/2022    Labs to be scheduled.    Strain of left groin  Resolved at the time of our encounter. Continue OTC analgesics and rest if pain returns.    Need for diphtheria-tetanus-pertussis (Tdap) vaccine  -     (In Office Administered) Tdap Vaccine      Follow-up: Follow up in about 1 year (around 5/27/2023).  ______________________________________________________________________    Chief Complaint  Chief Complaint   Patient presents with    Annual Exam     Grion pain for over a month. No pain nitesh       HPI  William Walter is a 27 y.o. male with medical diagnoses as listed in the medical history and problem list that presents to the office his annual exam. He c/o groin pain which has resolved by the time of our encounter.     Groin Pain  The patient's primary symptoms include pelvic pain. The patient's pertinent negatives include no genital lesions, penile discharge or testicular pain. This is a new problem. The current episode started in the past 7 days. The problem occurs intermittently. The problem has been gradually improving. The patient is experiencing no pain. Pertinent negatives include no abdominal pain, chest pain, constipation, coughing, diarrhea, dysuria, fever, headaches, nausea, rash, shortness of breath, sore throat, urgency or vomiting. Exacerbated by: began while lifting a cough and worsened when moving AC units. He has tried OTC analgesics (Tylenol, Aleve) for the symptoms. The treatment provided significant relief.         PAST MEDICAL HISTORY:  Past Medical  History:   Diagnosis Date    ADHD (attention deficit hyperactivity disorder)        PAST SURGICAL HISTORY:  Past Surgical History:   Procedure Laterality Date    CIRCUMCISION      LEG SURGERY Right 2010    tibia fracture       SOCIAL HISTORY:  Social History     Socioeconomic History    Marital status: Single   Occupational History    Occupation: [employer]    Occupation: student, computer science   Tobacco Use    Smoking status: Never Smoker    Smokeless tobacco: Never Used   Substance and Sexual Activity    Alcohol use: No    Drug use: No    Sexual activity: Never       FAMILY HISTORY:  Family History   Problem Relation Age of Onset    No Known Problems Mother     No Known Problems Father     No Known Problems Brother        ALLERGIES AND MEDICATIONS: updated and reviewed.  Review of patient's allergies indicates:   Allergen Reactions    Fish containing products      Uvula swelling      Current Outpatient Medications   Medication Sig Dispense Refill    EPINEPHrine (EPIPEN) 0.3 mg/0.3 mL AtIn Inject 0.3 mLs (0.3 mg total) into the muscle once. for 1 dose 1 each 0    hydrocortisone 2.5 % cream Apply topically 2 (two) times daily. 28 g 1     No current facility-administered medications for this visit.         ROS  Review of Systems   Constitutional: Positive for activity change. Negative for fever and unexpected weight change.   HENT: Negative for congestion and sore throat.    Eyes: Negative for photophobia and visual disturbance.   Respiratory: Negative for cough and shortness of breath.    Cardiovascular: Negative for chest pain and leg swelling.   Gastrointestinal: Negative for abdominal pain, constipation, diarrhea, nausea and vomiting.   Endocrine: Negative for polydipsia, polyphagia and polyuria.   Genitourinary: Positive for pelvic pain. Negative for dysuria, penile discharge, testicular pain and urgency.   Musculoskeletal: Negative for arthralgias, back pain and gait problem.   Skin: Negative for  "color change and rash.   Neurological: Negative for dizziness, weakness and headaches.   Psychiatric/Behavioral: Negative for dysphoric mood and sleep disturbance. The patient is not nervous/anxious.            Physical Exam  Vitals:    05/27/22 0947   BP: 110/70   BP Location: Right arm   Patient Position: Sitting   BP Method: Large (Manual)   Pulse: 64   Temp: 98.3 °F (36.8 °C)   TempSrc: Oral   SpO2: 97%   Weight: 115.9 kg (255 lb 10 oz)   Height: 6' 1" (1.854 m)    Body mass index is 33.73 kg/m².  Weight: 115.9 kg (255 lb 10 oz)   Height: 6' 1" (185.4 cm)   Physical Exam  Constitutional:       General: He is not in acute distress.     Appearance: He is obese.   HENT:      Head: Normocephalic and atraumatic.      Right Ear: Tympanic membrane and ear canal normal.      Left Ear: Tympanic membrane and ear canal normal.      Nose: Nose normal.      Mouth/Throat:      Mouth: Mucous membranes are moist.      Pharynx: Oropharynx is clear.   Eyes:      Conjunctiva/sclera: Conjunctivae normal.      Pupils: Pupils are equal, round, and reactive to light.   Neck:      Thyroid: No thyromegaly.   Cardiovascular:      Rate and Rhythm: Normal rate and regular rhythm.      Pulses: Normal pulses.      Heart sounds: Normal heart sounds.   Pulmonary:      Effort: Pulmonary effort is normal. No respiratory distress.      Breath sounds: Normal breath sounds.   Musculoskeletal:      Cervical back: Neck supple.      Right lower leg: No edema.      Left lower leg: No edema.   Lymphadenopathy:      Cervical: No cervical adenopathy.   Skin:     General: Skin is warm and dry.      Findings: No rash.   Neurological:      General: No focal deficit present.      Mental Status: He is alert and oriented to person, place, and time.   Psychiatric:         Mood and Affect: Mood normal.         Behavior: Behavior normal.         Thought Content: Thought content normal.           "

## 2023-03-27 ENCOUNTER — TELEPHONE (OUTPATIENT)
Dept: ORTHOPEDICS | Facility: CLINIC | Age: 28
End: 2023-03-27
Payer: COMMERCIAL

## 2023-03-27 DIAGNOSIS — M51.36 DDD (DEGENERATIVE DISC DISEASE), LUMBAR: Primary | ICD-10-CM

## 2023-03-28 ENCOUNTER — OFFICE VISIT (OUTPATIENT)
Dept: ORTHOPEDICS | Facility: CLINIC | Age: 28
End: 2023-03-28
Payer: COMMERCIAL

## 2023-03-28 ENCOUNTER — HOSPITAL ENCOUNTER (OUTPATIENT)
Dept: RADIOLOGY | Facility: HOSPITAL | Age: 28
Discharge: HOME | End: 2023-03-28
Attending: ORTHOPAEDIC SURGERY
Payer: COMMERCIAL

## 2023-03-28 VITALS — HEIGHT: 76 IN | BODY MASS INDEX: 31.76 KG/M2 | WEIGHT: 260.81 LBS

## 2023-03-28 DIAGNOSIS — M54.41 ACUTE MIDLINE LOW BACK PAIN WITH RIGHT-SIDED SCIATICA: Primary | ICD-10-CM

## 2023-03-28 DIAGNOSIS — M51.36 DDD (DEGENERATIVE DISC DISEASE), LUMBAR: ICD-10-CM

## 2023-03-28 PROCEDURE — 1159F MED LIST DOCD IN RCRD: CPT | Mod: CPTII,S$GLB,, | Performed by: ORTHOPAEDIC SURGERY

## 2023-03-28 PROCEDURE — 99204 OFFICE O/P NEW MOD 45 MIN: CPT | Mod: S$GLB,,, | Performed by: ORTHOPAEDIC SURGERY

## 2023-03-28 PROCEDURE — 1160F RVW MEDS BY RX/DR IN RCRD: CPT | Mod: CPTII,S$GLB,, | Performed by: ORTHOPAEDIC SURGERY

## 2023-03-28 PROCEDURE — 72110 X-RAY EXAM L-2 SPINE 4/>VWS: CPT | Mod: 26,,, | Performed by: RADIOLOGY

## 2023-03-28 PROCEDURE — 99204 PR OFFICE/OUTPT VISIT, NEW, LEVL IV, 45-59 MIN: ICD-10-PCS | Mod: S$GLB,,, | Performed by: ORTHOPAEDIC SURGERY

## 2023-03-28 PROCEDURE — 1159F PR MEDICATION LIST DOCUMENTED IN MEDICAL RECORD: ICD-10-PCS | Mod: CPTII,S$GLB,, | Performed by: ORTHOPAEDIC SURGERY

## 2023-03-28 PROCEDURE — 1160F PR REVIEW ALL MEDS BY PRESCRIBER/CLIN PHARMACIST DOCUMENTED: ICD-10-PCS | Mod: CPTII,S$GLB,, | Performed by: ORTHOPAEDIC SURGERY

## 2023-03-28 PROCEDURE — 99999 PR PBB SHADOW E&M-EST. PATIENT-LVL III: CPT | Mod: PBBFAC,,, | Performed by: ORTHOPAEDIC SURGERY

## 2023-03-28 PROCEDURE — 72110 XR LUMBAR SPINE AP AND LAT WITH FLEX/EXT: ICD-10-PCS | Mod: 26,,, | Performed by: RADIOLOGY

## 2023-03-28 PROCEDURE — 3008F PR BODY MASS INDEX (BMI) DOCUMENTED: ICD-10-PCS | Mod: CPTII,S$GLB,, | Performed by: ORTHOPAEDIC SURGERY

## 2023-03-28 PROCEDURE — 99999 PR PBB SHADOW E&M-EST. PATIENT-LVL III: ICD-10-PCS | Mod: PBBFAC,,, | Performed by: ORTHOPAEDIC SURGERY

## 2023-03-28 PROCEDURE — 72110 X-RAY EXAM L-2 SPINE 4/>VWS: CPT | Mod: TC

## 2023-03-28 PROCEDURE — 3008F BODY MASS INDEX DOCD: CPT | Mod: CPTII,S$GLB,, | Performed by: ORTHOPAEDIC SURGERY

## 2023-03-28 RX ORDER — ACETAMINOPHEN 500 MG
TABLET ORAL
COMMUNITY
Start: 2023-01-13

## 2023-03-28 RX ORDER — IBUPROFEN 800 MG/1
800 TABLET ORAL
COMMUNITY
Start: 2023-01-13 | End: 2023-03-28

## 2023-03-28 RX ORDER — CYCLOBENZAPRINE HCL 10 MG
10 TABLET ORAL 3 TIMES DAILY PRN
Qty: 60 TABLET | Refills: 1 | Status: SHIPPED | OUTPATIENT
Start: 2023-03-28

## 2023-03-28 RX ORDER — PENICILLIN V POTASSIUM 500 MG/1
500 TABLET, FILM COATED ORAL 3 TIMES DAILY
COMMUNITY
Start: 2023-01-13

## 2023-03-28 RX ORDER — METHYLPREDNISOLONE 4 MG/1
TABLET ORAL
Qty: 21 TABLET | Refills: 0 | Status: SHIPPED | OUTPATIENT
Start: 2023-03-28

## 2023-03-28 RX ORDER — MELOXICAM 15 MG/1
15 TABLET ORAL DAILY
Qty: 60 TABLET | Refills: 1 | Status: SHIPPED | OUTPATIENT
Start: 2023-03-28

## 2023-03-28 NOTE — PROGRESS NOTES
DATE: 3/28/2023  PATIENT: William Walter    Attending Physician: Timbo Santoro M.D.    CHIEF COMPLAINT: LBP and RLE pain    HISTORY:  William Walter is a 27 y.o. male presents for initial evaluation of low back and right leg pain (Back - 5, Leg - 5). The pain has been present for a few weeks. The patient describes the pain as dull and it radiates posteriorly down R buttock to the mid-thigh.  The pain is worse with activity and improved by rest. There is no associated numbness and tingling. There is RLE subjective weakness. Prior treatments have included ibuprofen, but no PT, BRENDA or surgery.    The Patient denies myelopathic symptoms such as handwriting changes or difficulty with buttons/coins/keys. Denies perineal paresthesias, bowel/bladder dysfunction.    The patient does not smoke, have DM or endorse IVDU. The patient is not on any blood thinners and does not take chronic narcotics. 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    PAST MEDICAL/SURGICAL HISTORY:  Past Medical History:   Diagnosis Date    ADHD (attention deficit hyperactivity disorder)      Past Surgical History:   Procedure Laterality Date    CIRCUMCISION      LEG SURGERY Right 2010    tibia fracture       Current Medications:   Current Outpatient Medications:     acetaminophen (TYLENOL) 500 MG tablet, SMARTSI Tablet(s) By Mouth Every 6-8 Hours PRN, Disp: , Rfl:     cyclobenzaprine (FLEXERIL) 10 MG tablet, Take 1 tablet (10 mg total) by mouth 3 (three) times daily as needed for Muscle spasms., Disp: 60 tablet, Rfl: 1    EPINEPHrine (EPIPEN) 0.3 mg/0.3 mL AtIn, Inject 0.3 mLs (0.3 mg total) into the muscle once. for 1 dose, Disp: 1 each, Rfl: 0    hydrocortisone 2.5 % cream, Apply topically 2 (two) times daily. (Patient not taking: Reported on 3/28/2023), Disp: 28 g, Rfl: 1    meloxicam (MOBIC) 15 MG tablet, Take 1 tablet (15 mg total) by mouth once daily., Disp: 60 tablet, Rfl: 1    methylPREDNISolone (MEDROL DOSEPACK) 4 mg tablet, use as directed, Disp: 21  "tablet, Rfl: 0    penicillin v potassium (VEETID) 500 MG tablet, Take 500 mg by mouth 3 (three) times daily., Disp: , Rfl:     Social History:   Social History     Socioeconomic History    Marital status: Single   Occupational History    Occupation: [employer]    Occupation: student, computer science   Tobacco Use    Smoking status: Never    Smokeless tobacco: Never   Substance and Sexual Activity    Alcohol use: No    Drug use: No    Sexual activity: Never       REVIEW OF SYSTEMS:  Constitution: Negative. Negative for chills, fever and night sweats.   Cardiovascular: Negative for chest pain and syncope.   Respiratory: Negative for cough and shortness of breath.   Gastrointestinal: See HPI. Negative for nausea/vomiting. Negative for abdominal pain.  Genitourinary: See HPI. Negative for discoloration or dysuria.  Hematologic/Lymphatic: negative for bleeding/clotting disorders.   Musculoskeletal: Negative for falls and muscle weakness.   Neurological: See HPI. no history of seizures. no history of cranial surgery or shunts.  Neurological: See HPI. No seizures.   Endocrine: Negative for polydipsia, polyphagia and polyuria.   Allergic/Immunologic: Negative for hives and persistent infections.     EXAM:  Ht 6' 4" (1.93 m)   Wt 118.3 kg (260 lb 12.9 oz)   BMI 31.75 kg/m²     PHYSICAL EXAMINATION:    General: The patient is a 27 y.o. male in no apparent distress, the patient is orientatied to person, place and time.  Psych: Normal mood and affect  HEENT: Vision grossly intact, hearing intact to the spoken word.  Lungs: Respirations unlabored.  Gait: Normal station and gait, no difficulty with toe or heel walk.   Skin: Dorsal lumbar skin negative for rashes, lesions, hairy patches and surgical scars. There is lower lumbar tenderness to palpation.  Range of motion: Lumbar range of motion is acceptable.  Spinal Balance: Global saggital and coronal spinal balance acceptable, no significant for scoliosis and " kyphosis.  Musculoskeletal: No pain with the range of motion of the bilateral hips. No trochanteric tenderness to palpation.  Vascular: Bilateral lower extremities warm and well perfused, Dorsalis pedis pulses 2+ bilaterally.  Neurological: Normal strength and tone in all major motor groups in the bilateral lower extremities. Normal sensation to light touch in the L2-S1 dermatomes bilaterally.  Deep tendon reflexes symmetric 2+ in the bilateral lower extremities.  + R SLR    IMAGING:   Today I independently reviewed the following images and my interpretations are as follows:    AP, Lat and Flex/Ex  upright L-spine demonstrate no fractures or listhesis      Body mass index is 31.75 kg/m².  Hemoglobin A1C   Date Value Ref Range Status   04/17/2021 5.3 4.0 - 5.6 % Final     Comment:     ADA Screening Guidelines:  5.7-6.4%  Consistent with prediabetes  >or=6.5%  Consistent with diabetes    High levels of fetal hemoglobin interfere with the HbA1C  assay. Heterozygous hemoglobin variants (HbS, HgC, etc)do  not significantly interfere with this assay.   However, presence of multiple variants may affect accuracy.     05/22/2018 5.2 4.0 - 5.6 % Final     Comment:     According to ADA guidelines, hemoglobin A1c <7.0% represents  optimal control in non-pregnant diabetic patients. Different  metrics may apply to specific patient populations.   Standards of Medical Care in Diabetes-2016.  For the purpose of screening for the presence of diabetes:  <5.7%     Consistent with the absence of diabetes  5.7-6.4%  Consistent with increasing risk for diabetes   (prediabetes)  >or=6.5%  Consistent with diabetes  Currently, no consensus exists for use of hemoglobin A1c  for diagnosis of diabetes for children.  This Hemoglobin A1c assay has significant interference with fetal   hemoglobin   (HbF). The results are invalid for patients with abnormal amounts of   HbF,   including those with known Hereditary Persistence   of Fetal Hemoglobin.  Heterozygous hemoglobin variants (HbAS, HbAC,   HbAD, HbAE, HbA2) do not significantly interfere with this assay;   however, presence of multiple variants in a sample may impact the %   interference.         ASSESSMENT/PLAN:    William was seen today for back pain.    Diagnoses and all orders for this visit:    Acute midline low back pain with right-sided sciatica  -     methylPREDNISolone (MEDROL DOSEPACK) 4 mg tablet; use as directed  -     meloxicam (MOBIC) 15 MG tablet; Take 1 tablet (15 mg total) by mouth once daily.  -     cyclobenzaprine (FLEXERIL) 10 MG tablet; Take 1 tablet (10 mg total) by mouth 3 (three) times daily as needed for Muscle spasms.  -     Ambulatory referral/consult to Physical/Occupational Therapy; Future      Follow up if symptoms worsen or fail to improve.    Patient has LBP and RLE radiculopathy. I discussed the natural history of their diagnoses as well as surgical and nonsurgical treatment options. I educated the patient on the importance of core/back strengthening, correct posture, bending/lifting ergonomics, and low-impact aerobic exercises (walking, elliptical, and aquatherapy). I prescribed medrol dose pack, mobic and flexeril. Continue medications. I will refer the patient to PT for core/back strengthening. Patient will follow up PRN. Next step is a lumbar MRI.    Timbo Santoro MD  Orthopaedic Spine Surgeon  Department of Orthopaedic Surgery  [phone]

## 2023-05-12 ENCOUNTER — PATIENT MESSAGE (OUTPATIENT)
Dept: ORTHOPEDICS | Facility: CLINIC | Age: 28
End: 2023-05-12
Payer: COMMERCIAL

## 2024-06-13 ENCOUNTER — OFFICE VISIT (OUTPATIENT)
Dept: URGENT CARE | Facility: CLINIC | Age: 29
End: 2024-06-13
Payer: COMMERCIAL

## 2024-06-13 VITALS
SYSTOLIC BLOOD PRESSURE: 138 MMHG | HEART RATE: 60 BPM | DIASTOLIC BLOOD PRESSURE: 77 MMHG | TEMPERATURE: 99 F | BODY MASS INDEX: 31.76 KG/M2 | RESPIRATION RATE: 16 BRPM | OXYGEN SATURATION: 98 % | HEIGHT: 76 IN | WEIGHT: 260.81 LBS

## 2024-06-13 DIAGNOSIS — M25.571 ACUTE RIGHT ANKLE PAIN: ICD-10-CM

## 2024-06-13 DIAGNOSIS — S93.401A MODERATE RIGHT ANKLE SPRAIN, INITIAL ENCOUNTER: Primary | ICD-10-CM

## 2024-06-13 DIAGNOSIS — S93.601A RIGHT FOOT SPRAIN, INITIAL ENCOUNTER: ICD-10-CM

## 2024-06-13 DIAGNOSIS — M79.671 ACUTE FOOT PAIN, RIGHT: ICD-10-CM

## 2024-06-13 PROCEDURE — 73610 X-RAY EXAM OF ANKLE: CPT | Mod: RT,S$GLB,, | Performed by: RADIOLOGY

## 2024-06-13 PROCEDURE — 99214 OFFICE O/P EST MOD 30 MIN: CPT | Mod: S$GLB,,, | Performed by: EMERGENCY MEDICINE

## 2024-06-13 PROCEDURE — 73630 X-RAY EXAM OF FOOT: CPT | Mod: RT,S$GLB,, | Performed by: RADIOLOGY

## 2024-06-13 RX ORDER — IBUPROFEN 800 MG/1
800 TABLET ORAL EVERY 8 HOURS PRN
Qty: 21 TABLET | Refills: 0 | Status: SHIPPED | OUTPATIENT
Start: 2024-06-13 | End: 2024-06-20

## 2024-06-13 NOTE — PROGRESS NOTES
"Subjective:      Patient ID: William Walter is a 29 y.o. male.    Vitals:  height is 6' 4" (1.93 m) and weight is 118.3 kg (260 lb 12.9 oz). His oral temperature is 98.8 °F (37.1 °C). His blood pressure is 138/77 and his pulse is 60. His respiration is 16 and oxygen saturation is 98%.     Chief Complaint: Ankle Pain    Pt states he had twisted his right ankle while walking around 2pm and has continued to swell since then.     PATIENT IS A 29-YEAR-OLD MALE WHO WAS PLAYING SPORTS AND ATTEMPTED TO KICK AND PUNCH BALL AND LANDED AWKWARDLY WITH AN INVERSION TYPE INJURY TO THE RIGHT FOOT AND ANKLE.  REPORTS PAIN AND SWELLING TO THE ANTEROLATERAL ANKLE AND BASE OF THE 5TH METATARSAL ON EXAM.  NO MALLEOLAR PAIN.  WALKING WITH CRUTCHES THAT HE ALREADY HAD.  WILL GET A REASON RE-EVALUATE.  WILL CERTAINLY ENCOURAGE REST, ICE, PATIENT, AND INFLAMMATORIES +- WALKING BOOT    Ankle Pain   The incident occurred 1 to 3 hours ago. [place]. The injury mechanism was a direct blow. The pain is present in the right ankle. The quality of the pain is described as aching. The pain is at a severity of 6/10. The pain is moderate. The pain has been Constant since onset. Associated symptoms include an inability to bear weight and numbness. The symptoms are aggravated by palpation, movement and weight bearing. He has tried elevation and acetaminophen for the symptoms. The treatment provided no relief.       ROS  Constitution: Negative for chills and fever.   HENT:  Negative for postnasal drip, sinus pain and sore throat.    Neck: Negative for neck pain and neck stiffness.   Cardiovascular:  Negative for chest pain and palpitations.   Respiratory:  Negative for cough and shortness of breath.    Genitourinary:  Negative for dysuria and hematuria.   Musculoskeletal:  Positive for pain, trauma, joint pain and joint swelling.   Skin:  Negative for wound and laceration.   Neurological:  Positive for numbness. Negative for " altered mental status and tingling.   Psychiatric/Behavioral:  Negative for altered mental status.       Objective:     Physical Exam   Constitutional: He is oriented to person, place, and time. He appears well-developed. He is cooperative.   HENT:   Head: Normocephalic and atraumatic.   Ears:   Right Ear: Hearing, tympanic membrane, external ear and ear canal normal.   Left Ear: Hearing, tympanic membrane, external ear and ear canal normal.   Nose: Nose normal. No mucosal edema or nasal deformity. No epistaxis. Right sinus exhibits no maxillary sinus tenderness and no frontal sinus tenderness. Left sinus exhibits no maxillary sinus tenderness and no frontal sinus tenderness.   Mouth/Throat: Uvula is midline, oropharynx is clear and moist and mucous membranes are normal. No trismus in the jaw. Normal dentition. No uvula swelling.   Eyes: Conjunctivae and lids are normal.   Neck: Trachea normal and phonation normal. Neck supple.   Cardiovascular: Normal rate, regular rhythm, normal heart sounds and normal pulses.   Pulmonary/Chest: Effort normal and breath sounds normal.   Abdominal: Normal appearance and bowel sounds are normal. Soft.   Musculoskeletal:         General: Swelling, tenderness and signs of injury present. No deformity.      Comments: EXAMINATION OF THE RIGHT LOWER EXTREMITY SHOWS MILD TENDERNESS TO PALPATION OF THE BASE OF THE 5TH METATARSAL AND TENDERNESS TO PALPATION TO THE RIGHT ANTEROLATERAL LIGAMENTOUS COMPLEX.  NO MALLEOLAR PAIN.  NO DEFORMITY, MINIMAL SWELLING.  DISTALLY NEUROVASCULARLY INTACT.  AMBULATORY WITH SIGNIFICANT PAIN USING CRUTCHES   Neurological: He is alert and oriented to person, place, and time. He exhibits normal muscle tone.   Skin: Skin is warm, dry and intact.   Psychiatric: His speech is normal and behavior is normal. Judgment and thought content normal.   Nursing note and vitals reviewed.     XRAY FOOT/ANKLE NEGATIVE FOR ACUTE FRACTURE/DISLOCATION. NO FRACTURE OF THE BASE OF  THE FIRST MT    X-Ray Foot Complete 3 view Right    Result Date: 6/13/2024  EXAMINATION: XR FOOT COMPLETE 3 VIEW RIGHT CLINICAL HISTORY: . Pain in right ankle and joints of right foot TECHNIQUE: AP, lateral, and oblique views of the right foot were performed. COMPARISON: None FINDINGS: No acute fracture, subluxation or dislocation.  No mass or foreign body.  No significant arthropathy.     No acute radiographic abnormality Electronically signed by: Edison Landon Date:    06/13/2024 Time:    18:37    X-Ray Ankle Complete 3 View Right    Result Date: 6/13/2024  EXAMINATION: XR ANKLE COMPLETE 3 VIEW RIGHT CLINICAL HISTORY: Pain in right ankle and joints of right foot TECHNIQUE: AP, lateral, and oblique images of the right ankle were performed. COMPARISON: None FINDINGS: No acute fracture, subluxation or dislocation.  No mass or foreign body.  No significant arthropathy.     No acute radiographic abnormality. Electronically signed by: Edison Landon Date:    06/13/2024 Time:    18:28       Assessment:     1. Moderate right ankle sprain, initial encounter    2. Right foot sprain, initial encounter    3. Acute right ankle pain    4. Acute foot pain, right        Plan:       Moderate right ankle sprain, initial encounter  -     IMMOBILIZER FOR HOME USE    Right foot sprain, initial encounter  -     IMMOBILIZER FOR HOME USE    Acute right ankle pain  -     X-Ray Foot Complete 3 view Right; Future; Expected date: 06/13/2024  -     X-Ray Ankle Complete 3 View Right; Future; Expected date: 06/13/2024    Acute foot pain, right  -     X-Ray Foot Complete 3 view Right; Future; Expected date: 06/13/2024  -     X-Ray Ankle Complete 3 View Right; Future; Expected date: 06/13/2024    Other orders  -     ibuprofen (ADVIL,MOTRIN) 800 MG tablet; Take 1 tablet (800 mg total) by mouth every 8 (eight) hours as needed for Pain.  Dispense: 21 tablet; Refill: 0      Patient Instructions   REST, ICE, ELEVATE  USE LACE-UP ANKLE SPLINT FOR  COMFORT AND SUPPORT  IBUPROFEN 800 MG PRESCRIPTION   USE CRUTCHES AS NEEDED HOWEVER EARLY RANGE OF MOTION AND EARLY WALKING BETTER FOR LONG-TERM PROGRESS     SEE ANKLE AND FOOT SPRAIN SHEETS     RETURN FOR ANY CONCERNS OR PROBLEMS AND FOLLOW-UP WITH ORTHOPEDIST IF HAVING SYMPTOMS 1-2 WEEKS FOR NOW.

## 2024-06-13 NOTE — LETTER
June 13, 2024      [facility]  [address], [address]  [place] [address]  Phone: [phone]  Fax: [phone]       Patient: William Walter   YOB: 1995  Date of Visit: 06/13/2024    To Whom It May Concern:    Nilo Walter  was at [facility] on 06/13/2024. The patient may return to work/school on 6/15/2024 with no restrictions. If you have any questions or concerns, or if I can be of further assistance, please do not hesitate to contact me.    Sincerely,      Chema Isidro MD

## 2024-06-13 NOTE — PATIENT INSTRUCTIONS
REST, ICE, ELEVATE  USE LACE-UP ANKLE SPLINT FOR COMFORT AND SUPPORT  IBUPROFEN 800 MG PRESCRIPTION   USE CRUTCHES AS NEEDED HOWEVER EARLY RANGE OF MOTION AND EARLY WALKING BETTER FOR LONG-TERM PROGRESS     SEE ANKLE AND FOOT SPRAIN SHEETS     RETURN FOR ANY CONCERNS OR PROBLEMS AND FOLLOW-UP WITH ORTHOPEDIST IF HAVING SYMPTOMS 1-2 WEEKS FOR NOW.